# Patient Record
Sex: MALE | URBAN - METROPOLITAN AREA
[De-identification: names, ages, dates, MRNs, and addresses within clinical notes are randomized per-mention and may not be internally consistent; named-entity substitution may affect disease eponyms.]

---

## 2021-07-01 ENCOUNTER — EMERGENCY (EMERGENCY)
Facility: HOSPITAL | Age: 31
LOS: 1 days | Discharge: HOME | End: 2021-07-01
Attending: EMERGENCY MEDICINE
Payer: COMMERCIAL

## 2021-07-01 VITALS
HEIGHT: 68 IN | TEMPERATURE: 99 F | RESPIRATION RATE: 18 BRPM | OXYGEN SATURATION: 99 % | DIASTOLIC BLOOD PRESSURE: 61 MMHG | WEIGHT: 223.99 LBS | SYSTOLIC BLOOD PRESSURE: 120 MMHG | HEART RATE: 67 BPM

## 2021-07-01 PROCEDURE — 99282 EMERGENCY DEPT VISIT SF MDM: CPT

## 2021-07-01 PROCEDURE — 99283 EMERGENCY DEPT VISIT LOW MDM: CPT

## 2021-07-01 NOTE — ED PROVIDER NOTE - PATIENT PORTAL LINK FT
You can access the FollowMyHealth Patient Portal offered by [facility] by registering at the following website: [email or web address]. By joining [facility]’s FollowMyHealth portal, you will also be able to view your health information using other applications (apps) compatible with our system.

## 2021-07-01 NOTE — ED PROVIDER NOTE - CLINICAL SUMMARY MEDICAL DECISION MAKING FREE TEXT BOX
weeks of intermittent head pressure, was htn to 140/100 at urgent care, normotensive here, no symptoms currently. neuro intact. discussion of w/u with pt, pt states would rather have outpt w/u given he feels fine now, will give neuro. return precautions. advised to log bp.  stable for d/c.

## 2021-07-01 NOTE — ED PROVIDER NOTE - CARE PROVIDER_API CALL
Jacinto Cuellar; PhD)  Neurology; Vascular Neurology  [address]  [place] [address]  Phone: [phone]  Fax: [phone]  Follow Up Time: 4-6 Days

## 2021-07-01 NOTE — ED PROVIDER NOTE - OBJECTIVE STATEMENT
29 y/o male no pmh c/o several weeks of intermittent "pressure/tightness" to his face between his forehead and his teeth per pt. States usually on left side, sometimes on right side. STates it is not painful, just tightness, often when bp is higher. No actual numbness/tingling per pt. No vomiting, no f/c, no visual changes, no other complaints. Urgent care sent pt because bp was 140/100. Pt denies any acute change in symptoms. States sometimes symptoms worse when hasn't eaten all day and feels better.     ROS: No fever/chills. No eye pain/changes in vision, No ear pain/sore throat/dysphagia, No chest pain/palpitations. No SOB/cough/. No abdominal pain, N/V/D, no black/bloody bm. No dysuria/frequency/discharge, No headache. No Dizziness.    No rashes or breaks in skin. No numbness/tingling/weakness.

## 2021-07-01 NOTE — ED ADULT TRIAGE NOTE - CHIEF COMPLAINT QUOTE
patient was seen at [facility] complaining of pressure/ tightness to head denies N/V or vision changes

## 2021-07-01 NOTE — ED PROVIDER NOTE - PHYSICAL EXAMINATION
Gen: No acute distress, non toxic, very well appearing  HEENT: Mucous membranes moist, pink conjunctivae, EOMI  CV: RRR, nl s1/s2.  Resp: CTAB, normal rate and effort  GI: Abdomen soft, NT, ND. No rebound, no guarding  : No CVAT  Neuro: A&O x 3, moving all 4 extremities. cn 2-12 wnl. nl motor/sensation.   MSK: No spine or joint tenderness to palpation  Skin: No rashes. intact and perfused.